# Patient Record
Sex: MALE | Race: BLACK OR AFRICAN AMERICAN | NOT HISPANIC OR LATINO | Employment: FULL TIME | URBAN - METROPOLITAN AREA
[De-identification: names, ages, dates, MRNs, and addresses within clinical notes are randomized per-mention and may not be internally consistent; named-entity substitution may affect disease eponyms.]

---

## 2020-01-01 ENCOUNTER — HOSPITAL ENCOUNTER (EMERGENCY)
Facility: HOSPITAL | Age: 65
Discharge: HOME | End: 2020-01-02
Attending: EMERGENCY MEDICINE
Payer: COMMERCIAL

## 2020-01-01 DIAGNOSIS — J40 BRONCHITIS: Primary | ICD-10-CM

## 2020-01-01 DIAGNOSIS — R05.9 COUGH: ICD-10-CM

## 2020-01-01 DIAGNOSIS — I10 HYPERTENSION, UNSPECIFIED TYPE: ICD-10-CM

## 2020-01-01 PROCEDURE — 99284 EMERGENCY DEPT VISIT MOD MDM: CPT | Mod: 25,ER

## 2020-01-01 PROCEDURE — 93010 EKG 12-LEAD: ICD-10-PCS | Mod: ,,, | Performed by: INTERNAL MEDICINE

## 2020-01-01 PROCEDURE — 25000242 PHARM REV CODE 250 ALT 637 W/ HCPCS: Mod: ER | Performed by: EMERGENCY MEDICINE

## 2020-01-01 PROCEDURE — 25000003 PHARM REV CODE 250: Mod: ER | Performed by: EMERGENCY MEDICINE

## 2020-01-01 PROCEDURE — 96372 THER/PROPH/DIAG INJ SC/IM: CPT | Mod: ER

## 2020-01-01 PROCEDURE — 94760 N-INVAS EAR/PLS OXIMETRY 1: CPT | Mod: ER

## 2020-01-01 PROCEDURE — 94640 AIRWAY INHALATION TREATMENT: CPT | Mod: ER

## 2020-01-01 PROCEDURE — 63600175 PHARM REV CODE 636 W HCPCS: Mod: ER | Performed by: EMERGENCY MEDICINE

## 2020-01-01 PROCEDURE — 87502 INFLUENZA DNA AMP PROBE: CPT | Mod: ER

## 2020-01-01 PROCEDURE — 93010 ELECTROCARDIOGRAM REPORT: CPT | Mod: ,,, | Performed by: INTERNAL MEDICINE

## 2020-01-01 PROCEDURE — 93005 ELECTROCARDIOGRAM TRACING: CPT | Mod: ER

## 2020-01-01 RX ORDER — BENZONATATE 100 MG/1
200 CAPSULE ORAL
Status: COMPLETED | OUTPATIENT
Start: 2020-01-01 | End: 2020-01-01

## 2020-01-01 RX ORDER — METHYLPREDNISOLONE SOD SUCC 125 MG
125 VIAL (EA) INJECTION
Status: COMPLETED | OUTPATIENT
Start: 2020-01-01 | End: 2020-01-01

## 2020-01-01 RX ORDER — FLUTICASONE PROPIONATE AND SALMETEROL 250; 50 UG/1; UG/1
1 POWDER RESPIRATORY (INHALATION) 2 TIMES DAILY
COMMUNITY

## 2020-01-01 RX ORDER — IPRATROPIUM BROMIDE AND ALBUTEROL SULFATE 2.5; .5 MG/3ML; MG/3ML
3 SOLUTION RESPIRATORY (INHALATION)
Status: COMPLETED | OUTPATIENT
Start: 2020-01-01 | End: 2020-01-01

## 2020-01-01 RX ADMIN — BENZONATATE 200 MG: 100 CAPSULE ORAL at 11:01

## 2020-01-01 RX ADMIN — METHYLPREDNISOLONE SODIUM SUCCINATE 125 MG: 125 INJECTION, POWDER, FOR SOLUTION INTRAMUSCULAR; INTRAVENOUS at 11:01

## 2020-01-01 RX ADMIN — IPRATROPIUM BROMIDE AND ALBUTEROL SULFATE 3 ML: .5; 3 SOLUTION RESPIRATORY (INHALATION) at 11:01

## 2020-01-02 VITALS
DIASTOLIC BLOOD PRESSURE: 85 MMHG | RESPIRATION RATE: 16 BRPM | BODY MASS INDEX: 29.33 KG/M2 | HEIGHT: 69 IN | TEMPERATURE: 98 F | SYSTOLIC BLOOD PRESSURE: 149 MMHG | OXYGEN SATURATION: 95 % | HEART RATE: 85 BPM | WEIGHT: 198 LBS

## 2020-01-02 LAB
INFLUENZA A, MOLECULAR: NEGATIVE
INFLUENZA B, MOLECULAR: NEGATIVE
SPECIMEN SOURCE: NORMAL

## 2020-01-02 PROCEDURE — 63600175 PHARM REV CODE 636 W HCPCS: Mod: ER | Performed by: EMERGENCY MEDICINE

## 2020-01-02 RX ORDER — LEVOFLOXACIN 750 MG/1
750 TABLET ORAL
Status: COMPLETED | OUTPATIENT
Start: 2020-01-02 | End: 2020-01-02

## 2020-01-02 RX ORDER — LEVOFLOXACIN 500 MG/1
500 TABLET, FILM COATED ORAL DAILY
Qty: 5 TABLET | Refills: 0 | Status: SHIPPED | OUTPATIENT
Start: 2020-01-02 | End: 2020-01-07

## 2020-01-02 RX ORDER — PREDNISONE 20 MG/1
60 TABLET ORAL DAILY
Qty: 21 TABLET | Refills: 0 | Status: SHIPPED | OUTPATIENT
Start: 2020-01-02 | End: 2020-01-09

## 2020-01-02 RX ORDER — ALBUTEROL SULFATE 90 UG/1
1-2 AEROSOL, METERED RESPIRATORY (INHALATION) EVERY 6 HOURS PRN
Qty: 1 INHALER | Refills: 0 | Status: SHIPPED | OUTPATIENT
Start: 2020-01-02 | End: 2021-01-01

## 2020-01-02 RX ADMIN — LEVOFLOXACIN 750 MG: 750 TABLET, FILM COATED ORAL at 12:01

## 2020-01-02 NOTE — ED TRIAGE NOTES
Reports to ED c c/o cough x 1 week. Stated this evening had an occurrence of dark red blood mixed c sputum. Denies chest pain, SOB, or fever. Hx of asthma and HTN. States he was going to be put on HTN medications x 2 weeks ago when he left from [place]; however never got medications

## 2020-01-02 NOTE — ED PROVIDER NOTES
Encounter Date: 1/1/2020       History     Chief Complaint   Patient presents with    Hemoptysis     Patient stated he coughed up dark red blood mixed with sputum this evening. PT stated he has had a cough x1 week. Denies chest pain or SOB. No fever at home.      HPI   64 y.o.  1 week of cough, wheezing  H/o asthma  Nonsmoker  Coughed sputum with some blood tingue this vinny  No leg pain nor swelling  Was told HTN but not on meds    Review of patient's allergies indicates:   Allergen Reactions    Azithromycin     Septra [sulfamethoxazole-trimethoprim]     Sulfa (sulfonamide antibiotics)      History reviewed. No pertinent past medical history.  History reviewed. No pertinent surgical history.  History reviewed. No pertinent family history.  Social History     Tobacco Use    Smoking status: Not on file   Substance Use Topics    Alcohol use: Not on file    Drug use: Not on file     Review of Systems  All systems were reviewed/examined and were negative except as noted in the HPI.    Physical Exam     Initial Vitals [01/01/20 2338]   BP Pulse Resp Temp SpO2   -- 85 20 98.2 °F (36.8 °C) 97 %      MAP       --         Physical Exam    Gen: awake, alert, NAD  Head NCAT, sclera clear  Neck supple, no meningismus  Chest exp wheezing, no respiratory distress  Heart RRR  ABD soft, nt  Back nt  Extremities W/WP no calf t  Skin w/d  Psych conversant  Neuro: A/A, JAIN      ED Course   Procedures  Labs Reviewed - No data to display       Imaging Results    None               Medical Decision Making:    This is an emergent evaluation of a patient presenting to the ED.  Nursing notes were reviewed.  I personally reviewed, read, and interpreted the ECG and any monitoring strips.  ECG: normal sinus rhythm, no critical findings with intervals, normal rate, and no ischemia.  Compared with prior if available.  Read and interpreted by me independently.      I reviewed radiology images personally along with  interpretations.  Questionable small R sided infil  I personally reviewed and interpreted the laboratory results.  Flu neg    I decided to obtain and review old medical records, which showed: primary care    Aki Olivier MD, EM            Evaluation for Emergency Medical Condition  The patient received a medical screening exam and within a reasonable degree of clinical confidence an emergency medical condition has not been identified.  The patient is instructed on proper follow up and return precautions to the ED.                     Clinical Impression:       ICD-10-CM ICD-9-CM   1. Bronchitis J40 490   2. Cough R05 786.2   3. Hypertension, unspecified type I10 401.9           Discharged to home in stable condition, return to ED warnings given, follow up and patient care instructions given.      Aki Olivier MD, EM, CPE, FACEP  Department of Emergency Medicine                   Davidson Olivier MD  01/02/20 0142